# Patient Record
Sex: MALE | Race: WHITE | NOT HISPANIC OR LATINO
[De-identification: names, ages, dates, MRNs, and addresses within clinical notes are randomized per-mention and may not be internally consistent; named-entity substitution may affect disease eponyms.]

---

## 2018-10-31 ENCOUNTER — TRANSCRIPTION ENCOUNTER (OUTPATIENT)
Age: 53
End: 2018-10-31

## 2020-11-25 ENCOUNTER — TRANSCRIPTION ENCOUNTER (OUTPATIENT)
Age: 55
End: 2020-11-25

## 2021-01-08 ENCOUNTER — TRANSCRIPTION ENCOUNTER (OUTPATIENT)
Age: 56
End: 2021-01-08

## 2021-03-17 ENCOUNTER — TRANSCRIPTION ENCOUNTER (OUTPATIENT)
Age: 56
End: 2021-03-17

## 2021-11-11 ENCOUNTER — NON-APPOINTMENT (OUTPATIENT)
Age: 56
End: 2021-11-11

## 2021-11-16 ENCOUNTER — APPOINTMENT (OUTPATIENT)
Dept: PULMONOLOGY | Facility: CLINIC | Age: 56
End: 2021-11-16
Payer: COMMERCIAL

## 2021-11-16 PROCEDURE — 99202 OFFICE O/P NEW SF 15 MIN: CPT | Mod: 95

## 2021-11-16 NOTE — ASSESSMENT
[FreeTextEntry1] : 56 year old GARY PERLMAN  with history of hypertension, hyperlipidemia, GERD, obesity class I; well treated with CPAP since 2015 for severe obstructive sleep apnea.\par \par SEVERE RSOIE on CPAP 11 cmH2O, using a nasal mask.\par \par ·	Patient continues to derive benefit from CPAP with improvement in sleep quality and resolution to snoring.  [place] Sleepiness Scale score of 4 today.  \par ·	Download of data demonstrates excellent compliance on 100% of nights, averaging 8 hours 37 minutes, excellent therapeutic response to CPAP with AHI of 1.2, and acceptable mask leak.    \par \par John recall of 2015 System One\par ·	Patient with severe ROSIE, excessive daytime sleepiness before therapy, benefitting from therapy, and not experiencing any adverse effect on CPAP therapy.  Discussed potential health risk related to ingestion / inhalation of the sound-abatement foam used, and risk-benefit of continued versus discontinued therapy until it is replaced, with option to self-pay for a new device.   \par ·	Advised patient to continue to follow device instructions for approved cleaning methods, and avoid ozone-based PAP sanitizers.  \par \par PLAN:\par ·	Patients opts to continue use of recalled CPAP until it is replaced.\par ·	[facility] to replace >5 year old CPAP to continue therapy.\par ·	Follow-up in one year or sooner if needed.\par \par \par

## 2021-11-16 NOTE — REVIEW OF SYSTEMS
[Obesity] : obesity [Heartburn] : heartburn [Negative] : Neurologic [Fatigue] : fatigue [Recent Wt Loss (___ Lbs)] : no recent weight loss [Shortness Of Breath] : no shortness of breath [Chest Pain] : no chest pain [Palpitations] : no palpitations [Edema] : ~T edema was not present [Thyroid Disease] : no thyroid disease [Diabetes] : no diabetes  [Anemia] : no anemia [History of Iron Deficiency] : no history of iron deficiency [Nocturia] : no nocturia [Arthralgias] : no arthralgias [Depression] : no depression [Anxious] : not anxious [Snoring] : no snoring [Witnessed Apneas] : demonstrated no ~M apnea [Frequent Nocturnal Awakenings] : no nocturnal awakenings from sleep [Daytime Somnolence: ESS=____] : no daytime somnolence [Awakes Unrefreshed] : restorative sleep [Awakes With Headache] : awakes without a headache [Awakes With Dry Mouth] : awakes without dry mouth [Recent Wt Gain (___ Lbs)] : no recent weight gain [Difficulty Initiating Sleep] : no difficulty falling asleep [Difficulty Maintaining Sleep] : no difficulty maintaining sleep [Lower Extremity Discomfort] : no lower extremity discomfort [Unusual Sleep Behavior] : no unusual sleep behavior [FreeTextEntry8] : allergies, on singulair + nasacort [de-identified] : occasional ambien 10 mg  [FreeTextEntry1] : 11:30 pm [FreeTextEntry3] : 15 mins. [FreeTextEntry2] : 7:30 am [FreeTextEntry4] : 1-2 [FreeTextEntry5] : brief [FreeTextEntry6] : 6-8 hours [FreeTextEntry7] : Denies

## 2021-11-16 NOTE — HISTORY OF PRESENT ILLNESS
[Home] : at home, [unfilled] , at the time of the visit. [Other Location: e.g. Home (Enter Location, City,State)___] : at [unfilled] [Verbal consent obtained from patient] : the patient, [unfilled] [FreeTextEntry1] : 56 year old GARY PERLMAN  with history of severe obstructive sleep apnea, hypertension, hyperlipidemia, GERD, obesity class I, presents for follow-up to 2015 visit.\par \par SEVERE ROSIE on CPAP 11 cmH2O, using a nasal mask.\par \par DX \par ·	 signs + symptoms: hypersomnia, fatigue, nocturia, loud snoring, FTP III.\par ·	Diagnostic HST 2015 AHI: 40.1.  T90 19.7. Lowest 70%.\par [place]\par ·	Titration home study:  2015 CPAP 11 cmH2O reduced AHI to 5.1 using AFP10 NPM.  \par ·	System One set-up 2015 by [facility].     \par \par PATIENT REPORTS\par ·	He received notification of John recall and registered his machine.\par ·	With CPAP use nightly, his sleep has improved, and he is not snoring\par ·	Occasional nonrestorative sleep, occasional sleepiness and fatigue.\par ·	Sleeps: 6-8 hours between 11:30pm-9:30am.  SOL 15 mins.  1-2 brief awakenings to urinate.\par ·	May doze off in the afternoon or moody while watching TV.\par ·	Ambien 10 mg use  nights and an occasional weeknight. One Rx may last 3 months. \par ·	Fatigue attributed to losartan (discontinued).  Awaiting new antihypertensive. \par ·	Occupation  , occasionally works from home.\par \par [place] SLEEPINESS SCALE\par \par How likely are you to doze off or fall asleep in the situations described below, in contrast to feeling just tired?  This refers to your usual way of life in recent times.  Even if you haven't done some of these things recently, try to work out how they would have affected you.  Use the following scale to choose one most appropriate number for each situation.......Chance of dozin= never. 1= slight. 2= moderate. 3= high.\par \par CHANCE OF DOZING............SITUATION\par 0.............................................Sitting and reading\par 2.............................................Watching TV\par 0.............................................Sitting inactive in a public place (eg a theatre or a meeting)\par 0.............................................As a passenger in a car for an hour without a break\par 2.............................................Lying down to rest in the afternoon when circumstances permit\par 0.............................................Sitting and talking to someone\par 0.............................................Sitting quietly after lunch without alcohol\par 0.............................................In a car, while stopped for a few minutes in traffic\par \par 4............................................TOTAL ESS SCORE\par

## 2021-11-26 ENCOUNTER — NON-APPOINTMENT (OUTPATIENT)
Age: 56
End: 2021-11-26

## 2021-12-07 ENCOUNTER — NON-APPOINTMENT (OUTPATIENT)
Age: 56
End: 2021-12-07

## 2022-02-25 ENCOUNTER — TRANSCRIPTION ENCOUNTER (OUTPATIENT)
Age: 57
End: 2022-02-25

## 2022-10-20 ENCOUNTER — APPOINTMENT (OUTPATIENT)
Dept: PULMONOLOGY | Facility: CLINIC | Age: 57
End: 2022-10-20

## 2022-10-20 DIAGNOSIS — Z99.89 DEPENDENCE ON OTHER ENABLING MACHINES AND DEVICES: ICD-10-CM

## 2022-10-20 DIAGNOSIS — D64.9 ANEMIA, UNSPECIFIED: ICD-10-CM

## 2022-10-20 DIAGNOSIS — G47.33 OBSTRUCTIVE SLEEP APNEA (ADULT) (PEDIATRIC): ICD-10-CM

## 2022-10-20 DIAGNOSIS — I10 ESSENTIAL (PRIMARY) HYPERTENSION: ICD-10-CM

## 2022-10-20 PROCEDURE — 99212 OFFICE O/P EST SF 10 MIN: CPT | Mod: 95

## 2022-10-20 RX ORDER — GLUC/MSM/COLGN2/HYAL/ANTIARTH3 375-375-20
TABLET ORAL TWICE DAILY
Refills: 0 | Status: ACTIVE | COMMUNITY

## 2022-10-20 RX ORDER — MUPIROCIN 20 MG/G
2 OINTMENT TOPICAL
Qty: 22 | Refills: 0 | Status: DISCONTINUED | COMMUNITY
Start: 2022-05-02

## 2022-10-20 RX ORDER — PANTOPRAZOLE 40 MG/1
40 TABLET, DELAYED RELEASE ORAL
Qty: 90 | Refills: 0 | Status: ACTIVE | COMMUNITY
Start: 2022-05-20

## 2022-10-20 RX ORDER — NIRMATRELVIR AND RITONAVIR 300-100 MG
20 X 150 MG & KIT ORAL
Qty: 30 | Refills: 0 | Status: COMPLETED | COMMUNITY
Start: 2022-05-05

## 2022-10-20 RX ORDER — AMLODIPINE BESYLATE 10 MG/1
10 TABLET ORAL
Qty: 90 | Refills: 0 | Status: ACTIVE | COMMUNITY
Start: 2022-08-25

## 2022-10-20 RX ORDER — ZOLPIDEM TARTRATE 10 MG/1
10 TABLET ORAL
Qty: 30 | Refills: 0 | Status: ACTIVE | COMMUNITY
Start: 2022-08-25

## 2022-10-20 RX ORDER — PANTOPRAZOLE SODIUM 20 MG/1
TABLET, DELAYED RELEASE ORAL
Refills: 0 | Status: COMPLETED | COMMUNITY
End: 2022-10-20

## 2022-10-20 NOTE — ASSESSMENT
[FreeTextEntry1] : 57 year old GARY PERLMAN continues to derive benefit from CPAP for SEVERE OBSTRUCTIVE SLEEP APNEA (AHI 40.1) since 2015, currently at 11 cmH2O.\par \par History of:  HTN, HLD, GERD, obesity class I.\par \par Device:  REMstar Pro setup 11/9/2015 by [facility].  \par Excellent CPAP Compliance (100% of days, 100% >4-hrs, average 9 hrs. 8 mins) and therapeutic response (AHI 1.8, with no significant mask leak (average 0 secs).  \par \par PLAN:\par \par Faxed John an order to replace recall\par Fax # [phone] \par REMstar Pro (System One 60 Series),  [id number] \par Registration CF # [id number].\par \par Order copy emailed to patient\par [facility] PAP resupply to continue therapy at current settings.\par Follow-up annually or sooner if needed.\par \par \par \par \par  Hx of T2DM on metformin 500mg BID.  -A1c 7  - Lipid panel with low HDL at 33 with total cholesterol WNL  -ISS, FSq6 while NPO Hx of T2DM on metformin 500mg BID.  - A1c 7  - Lipid panel with low HDL at 33 with total cholesterol WNL  - ISS, FSq6 while NPO

## 2022-10-20 NOTE — HISTORY OF PRESENT ILLNESS
[Obstructive Sleep Apnea] : obstructive sleep apnea [Awakes Unrefreshed] : awakening unrefreshed [Daytime Somnolence] : daytime somnolence [To Bed: ___] : ~he/she~ goes to bed at [unfilled] [Arises: ___] : arises at [unfilled] [Sleep Onset Latency: ___ minutes] : sleep onset latency of [unfilled] minutes reported [Nocturnal Awakenings: ___] : ~he/she~ typically has [unfilled] nocturnal awakenings [WASO: ___] : Wake time after sleep onset is [unfilled] [TST: ___] : Total sleep time is [unfilled] [Daytime Sleep: ___] : daytime sleep: [unfilled] [AHI: ___ per hour] : Apnea-hypopnea index:  [unfilled] per hour [T90%: ___] : T90%: [unfilled]% [Jarrett desatuation%: ___] : Jarrett desaturation:  [unfilled]% [Date: ___] : the most recent therapeutic polysomnogram was completed [unfilled] [CPAP: ___ cmH2O] : CPAP: [unfilled] cmH2O [% Days used: ____] : Days used: [unfilled] % [% Days used > 4 hrs: ____] : Days used > 4 hrs: [unfilled] % [Mean nightly usage: ___ hrs] : Mean nightly usage: [unfilled] hrs [Therapy based AHI: ___ /hr] : Therapy based AHI: [unfilled] / hr [Snoring] : no snoring [Witnessed Apneas] : no witnessed sleep apnea [Frequent Nocturnal Awakening] : no nocturnal awakening [Unintentional Sleep while Active] : no unintentional sleep while active [Unintentional Sleep While Inactive] : no unintentional sleep while inactive [Awakes with Headache] : no headache upon awakening [Awakening With Dry Mouth] : no dry mouth upon awakening [Recent  Weight Gain] : no recent weight gain [DIS] : no DIS [DMS] : no DMS [Unusual Sleep Behavior] : no unusual sleep behavior [Lower Extremity Discomfort] : no lower extremity discomfort in evening or at bedtime [Nocturnal Oxygen] : The patient does not use nocturnal oxygen [FreeTextEntry1] : 30-day CPAP compliance data.  Average leak 0 secs.  Pressure 11 cmH2O.

## 2022-11-23 ENCOUNTER — OFFICE (OUTPATIENT)
Dept: URBAN - METROPOLITAN AREA CLINIC 35 | Facility: CLINIC | Age: 57
Setting detail: OPHTHALMOLOGY
End: 2022-11-23
Payer: COMMERCIAL

## 2022-11-23 ENCOUNTER — RX ONLY (RX ONLY)
Age: 57
End: 2022-11-23

## 2022-11-23 DIAGNOSIS — H02.31: ICD-10-CM

## 2022-11-23 DIAGNOSIS — H02.34: ICD-10-CM

## 2022-11-23 DIAGNOSIS — H11.153: ICD-10-CM

## 2022-11-23 DIAGNOSIS — H02.403: ICD-10-CM

## 2022-11-23 PROBLEM — H52.7 REFRACTIVE ERROR: Status: ACTIVE | Noted: 2022-11-23

## 2022-11-23 PROBLEM — Z96.1 PSEUDOPHAKIA ; LEFT EYE: Status: ACTIVE | Noted: 2022-11-23

## 2022-11-23 PROCEDURE — 92004 COMPRE OPH EXAM NEW PT 1/>: CPT | Performed by: OPHTHALMOLOGY

## 2022-11-23 ASSESSMENT — REFRACTION_MANIFEST
OS_CYLINDER: +0.75
OD_CYLINDER: +0.25
OD_ADD: +3.00
OS_AXIS: 035
OS_SPHERE: -0.75
OD_VA1: 20/20
OS_CYLINDER: +1.25
OS_AXIS: 020
OS_VA1: 20/20
OD_SPHERE: -1.00
OS_ADD: +3.00
OD_AXIS: 160
OS_VA1: 20/20
OD_AXIS: 135
OD_CYLINDER: +0.25
OD_VA1: 20/25
OS_SPHERE: -0.75
OD_SPHERE: -1.00

## 2022-11-23 ASSESSMENT — AXIALLENGTH_DERIVED
OD_AL: 34.2
OD_AL: 34.2
OS_AL: 23.1442
OS_AL: 23.1442
OS_AL: 23.2383
OD_AL: 34.1023

## 2022-11-23 ASSESSMENT — SPHEQUIV_DERIVED
OD_SPHEQUIV: -0.875
OS_SPHEQUIV: -0.125
OD_SPHEQUIV: -0.75
OS_SPHEQUIV: -0.125
OS_SPHEQUIV: -0.375
OD_SPHEQUIV: -0.875

## 2022-11-23 ASSESSMENT — TONOMETRY
OD_IOP_MMHG: 14
OS_IOP_MMHG: 13

## 2022-11-23 ASSESSMENT — REFRACTION_AUTOREFRACTION
OS_AXIS: 023
OD_CYLINDER: +0.50
OD_AXIS: 161
OS_CYLINDER: +1.25
OS_SPHERE: -0.75
OD_SPHERE: -1.00

## 2022-11-23 ASSESSMENT — KERATOMETRY
OD_K2POWER_DIOPTERS: 44.75
OS_AXISANGLE_DEGREES: 039
OD_AXISANGLE_DEGREES: 145
OD_K1POWER_DIOPTERS: 4.25
OS_K1POWER_DIOPTERS: 44.25
OS_K2POWER_DIOPTERS: 45.50

## 2022-11-23 ASSESSMENT — LID POSITION - PTOSIS
OS_PTOSIS: LUL
OD_PTOSIS: RUL

## 2022-11-23 ASSESSMENT — LID POSITION - COMMENTS
OS_COMMENTS: BLEPHAROCHALASIS
OD_COMMENTS: BLEPHAROCHALASIS

## 2022-11-23 ASSESSMENT — REFRACTION_CURRENTRX
OS_OVR_VA: 20/
OD_OVR_VA: 20/
OS_VPRISM_DIRECTION: SV
OD_SPHERE: +1.50
OD_VPRISM_DIRECTION: SV
OS_SPHERE: +1.75

## 2022-11-23 ASSESSMENT — VISUAL ACUITY
OS_BCVA: 20/40
OD_BCVA: 20/20-

## 2022-11-23 ASSESSMENT — CONFRONTATIONAL VISUAL FIELD TEST (CVF)
OD_FINDINGS: FULL
OS_FINDINGS: FULL

## 2024-11-27 ENCOUNTER — APPOINTMENT (OUTPATIENT)
Dept: PODIATRY | Facility: CLINIC | Age: 59
End: 2024-11-27

## 2024-11-27 DIAGNOSIS — B07.0 PLANTAR WART: ICD-10-CM

## 2024-11-27 DIAGNOSIS — B35.3 TINEA PEDIS: ICD-10-CM

## 2024-11-27 DIAGNOSIS — M79.671 PAIN IN RIGHT FOOT: ICD-10-CM

## 2024-11-27 DIAGNOSIS — M79.672 PAIN IN RIGHT FOOT: ICD-10-CM

## 2024-11-27 PROCEDURE — 99203 OFFICE O/P NEW LOW 30 MIN: CPT | Mod: 25

## 2024-11-27 PROCEDURE — 17110 DESTRUCTION B9 LES UP TO 14: CPT

## 2024-11-27 RX ORDER — CLOTRIMAZOLE AND BETAMETHASONE DIPROPIONATE 10; .5 MG/G; MG/G
1-0.05 CREAM TOPICAL
Qty: 1 | Refills: 1 | Status: ACTIVE | COMMUNITY
Start: 2024-11-27 | End: 1900-01-01

## 2024-12-04 PROBLEM — B35.3 TINEA PEDIS: Status: ACTIVE | Noted: 2024-12-04

## 2024-12-04 PROBLEM — M79.671 PAIN IN BOTH FEET: Status: ACTIVE | Noted: 2024-12-04

## 2024-12-04 PROBLEM — B07.0 PLANTAR WART OF BOTH FEET: Status: ACTIVE | Noted: 2024-12-04

## 2024-12-06 ENCOUNTER — APPOINTMENT (OUTPATIENT)
Dept: INTERNAL MEDICINE | Facility: CLINIC | Age: 59
End: 2024-12-06